# Patient Record
(demographics unavailable — no encounter records)

---

## 2024-11-15 NOTE — PHYSICAL EXAM
[Normal] : affect was normal and insight and judgment were intact [de-identified] : AKs of b/l legs

## 2024-11-15 NOTE — ASSESSMENT
[FreeTextEntry1] : htn- bp improved today. refill losartan 50mg. cont to cut back on caffeine. monitor BP anxiety, insomnia - refill xanax prn AK - refill one tube of fluorouracil. pt to f/u with derm ED- refill sildenafil

## 2024-11-15 NOTE — HISTORY OF PRESENT ILLNESS
[de-identified] : Pt f/u htn. Pt was drinking 6 cups coffee each morning. Pt has cut down to 2 cups of coffee per day. Pt on losartan 50mg.  Pt f/u insomnia. He wakes up in middle of night at 3AM takes xanax 1/2 tab prn to help go back to sleep. Tolerating well w/ no AEs. Pt has hx of AKs. On fluorouracil cream from derm. Is changing insurance will run out of med prior to next derm appt w/ Dr. Mcnamara

## 2025-03-31 NOTE — ASSESSMENT
[FreeTextEntry1] : paronychia - already draining, cultured draining fluid. bactrim  course. Possible adverse effects discussed with pt htn- bp improved today. refill losartan 50mg. cont to cut back on caffeine. monitor BP anxiety, insomnia - refill xanax prn ED- refill sildenafil hair loss - refill propecia

## 2025-03-31 NOTE — HISTORY OF PRESENT ILLNESS
[de-identified] : Pt c/o R index finger infection x 3 days. Has been swollen, purulent, throbbing. Pt lanced area w/ needle yesterday which drained purulent fluid PMH htn, insomnia, ED, hair loss Pt f/u htn. Pt was drinking 6 cups coffee each morning. Pt has cut down to 2 cups of coffee per day. Pt on losartan 50mg.  Pt f/u insomnia. He wakes up in middle of night at 3AM takes xanax 1/2 tab prn to help go back to sleep. Tolerating well w/ no AEs.

## 2025-03-31 NOTE — PHYSICAL EXAM
[Normal] : affect was normal and insight and judgment were intact [de-identified] : R index finger paronychia, draining